# Patient Record
Sex: MALE | Race: WHITE | NOT HISPANIC OR LATINO | Employment: OTHER | URBAN - METROPOLITAN AREA
[De-identification: names, ages, dates, MRNs, and addresses within clinical notes are randomized per-mention and may not be internally consistent; named-entity substitution may affect disease eponyms.]

---

## 2018-09-16 PROBLEM — J39.8 TRACHEAL OBSTRUCTION: Status: ACTIVE | Noted: 2018-09-16

## 2018-09-17 PROBLEM — R63.8 ALTERATION IN NUTRITION: Status: ACTIVE | Noted: 2018-09-17

## 2018-09-17 PROBLEM — J96.02 ACUTE HYPERCAPNIC RESPIRATORY FAILURE: Status: ACTIVE | Noted: 2018-09-17

## 2018-09-17 PROBLEM — Z99.11 ON MECHANICALLY ASSISTED VENTILATION: Status: ACTIVE | Noted: 2018-09-17

## 2018-09-17 PROBLEM — E63.9 INADEQUATE DIETARY ENERGY INTAKE: Status: ACTIVE | Noted: 2018-09-17

## 2018-09-17 PROBLEM — I46.9 CARDIAC ARREST: Status: ACTIVE | Noted: 2018-09-17

## 2018-09-17 PROBLEM — E87.6 HYPOKALEMIA: Status: ACTIVE | Noted: 2018-09-17

## 2018-09-17 PROBLEM — G82.20 PARAPLEGIA: Status: ACTIVE | Noted: 2018-09-17
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
## 2018-09-17 PROBLEM — J69.0 ASPIRATION PNEUMONIA: Status: ACTIVE | Noted: 2018-09-17

## 2018-09-17 PROBLEM — T17.920A: Status: ACTIVE | Noted: 2018-09-17

## 2018-09-18 PROBLEM — Z43.3 COLOSTOMY CARE: Status: ACTIVE | Noted: 2018-09-18

## 2018-09-18 PROBLEM — S31.811A TEAR OF SKIN OF RIGHT BUTTOCK: Status: ACTIVE | Noted: 2018-09-18

## 2018-09-18 PROBLEM — L89.304: Status: ACTIVE | Noted: 2018-09-18

## 2018-09-18 PROBLEM — L89.891: Status: ACTIVE | Noted: 2018-09-18

## 2020-01-01 ENCOUNTER — OFFICE VISIT (OUTPATIENT)
Dept: WOUND CARE | Facility: HOSPITAL | Age: 50
End: 2020-01-01
Attending: SURGERY
Payer: MEDICARE

## 2020-01-01 VITALS
TEMPERATURE: 98 F | RESPIRATION RATE: 16 BRPM | DIASTOLIC BLOOD PRESSURE: 80 MMHG | HEART RATE: 75 BPM | SYSTOLIC BLOOD PRESSURE: 130 MMHG

## 2020-01-01 VITALS
RESPIRATION RATE: 18 BRPM | SYSTOLIC BLOOD PRESSURE: 116 MMHG | HEART RATE: 73 BPM | TEMPERATURE: 98 F | DIASTOLIC BLOOD PRESSURE: 91 MMHG

## 2020-01-01 VITALS
DIASTOLIC BLOOD PRESSURE: 81 MMHG | RESPIRATION RATE: 18 BRPM | SYSTOLIC BLOOD PRESSURE: 115 MMHG | TEMPERATURE: 98 F | HEART RATE: 72 BPM | HEART RATE: 76 BPM | HEART RATE: 75 BPM | SYSTOLIC BLOOD PRESSURE: 130 MMHG | SYSTOLIC BLOOD PRESSURE: 130 MMHG | TEMPERATURE: 99 F | TEMPERATURE: 99 F | DIASTOLIC BLOOD PRESSURE: 87 MMHG | DIASTOLIC BLOOD PRESSURE: 88 MMHG

## 2020-01-01 VITALS
DIASTOLIC BLOOD PRESSURE: 78 MMHG | RESPIRATION RATE: 20 BRPM | SYSTOLIC BLOOD PRESSURE: 114 MMHG | TEMPERATURE: 99 F | HEART RATE: 67 BPM

## 2020-01-01 VITALS
HEART RATE: 82 BPM | SYSTOLIC BLOOD PRESSURE: 132 MMHG | TEMPERATURE: 100 F | RESPIRATION RATE: 20 BRPM | DIASTOLIC BLOOD PRESSURE: 90 MMHG

## 2020-01-01 VITALS — HEART RATE: 67 BPM | DIASTOLIC BLOOD PRESSURE: 78 MMHG | SYSTOLIC BLOOD PRESSURE: 105 MMHG

## 2020-01-01 DIAGNOSIS — L97.522 NEUROPATHIC ULCER OF FOOT WITH FAT LAYER EXPOSED, LEFT: Primary | ICD-10-CM

## 2020-01-01 DIAGNOSIS — L97.522 NEUROPATHIC ULCER OF FOOT WITH FAT LAYER EXPOSED, LEFT: ICD-10-CM

## 2020-01-01 PROCEDURE — 99499 UNLISTED E&M SERVICE: CPT | Mod: ,,, | Performed by: SURGERY

## 2020-01-01 PROCEDURE — 11042 DBRDMT SUBQ TIS 1ST 20SQCM/<: CPT

## 2020-01-01 PROCEDURE — 27201912 HC WOUND CARE DEBRIDEMENT SUPPLIES

## 2020-01-01 PROCEDURE — 99212 OFFICE O/P EST SF 10 MIN: CPT

## 2020-01-01 PROCEDURE — 99499 NO LOS: ICD-10-PCS | Mod: ,,, | Performed by: SURGERY

## 2020-01-01 PROCEDURE — 99203 OFFICE O/P NEW LOW 30 MIN: CPT | Mod: 25

## 2020-05-13 PROBLEM — R33.9 INCOMPLETE BLADDER EMPTYING: Status: ACTIVE | Noted: 2020-01-01

## 2020-05-13 PROBLEM — N31.9 NEUROGENIC BLADDER: Status: ACTIVE | Noted: 2020-01-01

## 2020-06-11 PROBLEM — L97.522: Status: ACTIVE | Noted: 2020-01-01

## 2020-06-11 NOTE — ASSESSMENT & PLAN NOTE
Fourth met head and 5th met head plantar ulcers debrided today.  As a large amount of callus removed.  The 4th is into the fatty tissue layer but not any deeper.  The 5th metatarsal head is deeper and goes to the bone but bone is not exposed at this time. debridement was performed and silver product will be applied along with offloading.

## 2020-06-11 NOTE — PROGRESS NOTES
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    Timin    Severity:  Fat layer exposed bone palpable at 5th metatarsal head    Quality:  Fat layer exposed    Modifying Factors: quadriplegia           Current Assessment & Plan     Fourth met head and 5th met head plantar ulcers debrided today.  As a large amount of callus removed.  The 4th is into the fatty tissue layer but not any deeper.  The 5th metatarsal head is deeper and goes to the bone but bone is not exposed at this time. debridement was performed and silver product will be applied along with offloading.               See wound doc progress notes. Documents will be scanned.        Wally Hernández  [facility]

## 2020-06-18 NOTE — PROGRESS NOTES
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  No palpable bone today.  There is some granulation tissue.  Silver alginate has been used in the past.  I would like to switched to Mepilex Ag foam.  Sharp debridement performed today.    Location:  Left foot plantar aspect metatarsal heads of the 4th and 5th digits    Duration:  3 weeks    Context:  Patient is neuropathic is quadriplegic    Associated Signs & Symptoms:  No pain or sensation.    Timin    Severity:  Fat layer exposed bone palpable at 5th metatarsal head    Quality:  Fat layer exposed    Modifying Factors: quadriplegia                 See wound doc progress notes. Documents will be scanned.        Osvaldo Gonzalez Jr  [facility]

## 2020-06-25 NOTE — PROGRESS NOTES
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  No palpable bone today.  There is some granulation tissue.  Silver alginate has been used in the past.  I would like to switched to Mepilex Ag foam.  Sharp debridement performed today.    Location:  Left foot plantar aspect metatarsal heads of the 4th and 5th digits    Duration:  3 weeks    Context:  Patient is neuropathic is quadriplegic    Associated Signs & Symptoms:  No pain or sensation.    Timin    Severity:  Fat layer exposed bone palpable at 5th metatarsal head    Quality:  Fat layer exposed    Modifying Factors: quadriplegia           Current Assessment & Plan     The wounds are decreasing in size.  Continue same plan               See wound doc progress notes. Documents will be scanned.        Wally Hernández  [facility]

## 2020-06-29 PROBLEM — N39.0 UTI (URINARY TRACT INFECTION): Status: ACTIVE | Noted: 2020-01-01

## 2020-07-09 NOTE — PROGRESS NOTES
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  No palpable bone today.  There is some granulation tissue.  Silver alginate has been used in the past.  I would like to switched to Mepilex Ag foam.  Sharp debridement performed today.    Location:  Left foot plantar aspect metatarsal heads of the 4th and 5th digits    Duration:  3 weeks    Context:  Patient is neuropathic is quadriplegic    Associated Signs & Symptoms:  No pain or sensation.    Timin    Severity:  Fat layer exposed bone palpable at 5th metatarsal head    Quality:  Fat layer exposed    Modifying Factors: quadriplegia           Current Assessment & Plan     The 2 areas on the plantar aspect of the foot or debrided with a scalpel removing the callus which had old blood present.  Underneath these are 2 small ulcers.  Debridement is performed the subcutaneous tissue layers pressure applied.               See wound doc progress notes. Documents will be scanned.        Wally Hernández  [facility]

## 2020-07-09 NOTE — ASSESSMENT & PLAN NOTE
The 2 areas on the plantar aspect of the foot or debrided with a scalpel removing the callus which had old blood present.  Underneath these are 2 small ulcers.  Debridement is performed the subcutaneous tissue layers pressure applied.

## 2020-07-16 NOTE — PROGRESS NOTES
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  No palpable bone today.  There is some granulation tissue.  Silver alginate has been used in the past.  I would like to switched to Mepilex Ag foam.  Sharp debridement performed today.    Location:  Left foot plantar aspect metatarsal heads of the 4th and 5th digits    Duration:  3 weeks    Context:  Patient is neuropathic is quadriplegic    Associated Signs & Symptoms:  No pain or sensation.    Timin    Severity:  Fat layer exposed bone palpable at 5th metatarsal head    Quality:  Fat layer exposed    Modifying Factors: quadriplegia           Current Assessment & Plan     The wounds are getting smaller.  Continue same wound plan.               See wound doc progress notes. Documents will be scanned.        Wally Hernández  [facility]

## 2020-07-23 NOTE — PROGRESS NOTES
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  No palpable bone today.  There is some granulation tissue.  Silver alginate has been used in the past.  I would like to switched to Mepilex Ag foam.  Sharp debridement performed today.  Continue Mepilex Ag.    Location:  Left foot plantar aspect metatarsal heads of the 4th and 5th digits    Duration:  3 weeks    Context:  Patient is neuropathic is quadriplegic    Associated Signs & Symptoms:  No pain or sensation.    Timin    Severity:  Fat layer exposed bone palpable at 5th metatarsal head    Quality:  Fat layer exposed    Modifying Factors: quadriplegia                 See wound doc progress notes. Documents will be scanned.        Osvaldo Gonzalez Jr  [facility]

## 2020-07-30 NOTE — PROGRESS NOTES
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  No palpable bone today.  Wound appears healed today.  No debridement performed.  Patient still has Mepilex Ag at home.  I told him to continue this for protection and follow up with us in 2 weeks for surveillance.    Location:  Left foot plantar aspect metatarsal heads of the 4th and 5th digits    Duration:  3 weeks    Context:  Patient is neuropathic is quadriplegic    Associated Signs & Symptoms:  No pain or sensation.    Timin    Severity:  Fat layer exposed bone palpable at 5th metatarsal head    Quality:  Fat layer exposed    Modifying Factors: quadriplegia                 See wound doc progress notes. Documents will be scanned.        Osvaldo Gonzalez Jr  [facility]

## 2020-08-13 NOTE — PROGRESS NOTES
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  No palpable bone today.  Wound appears healed today.  No debridement performed.  Patient still has Mepilex Ag at home.  I told him to continue this for protection and follow up with us in 2 weeks for surveillance.    Location:  Left foot plantar aspect metatarsal heads of the 4th and 5th digits    Duration:  3 weeks    Context:  Patient is neuropathic is quadriplegic    Associated Signs & Symptoms:  No pain or sensation.    Timin    Severity:  Fat layer exposed bone palpable at 5th metatarsal head    Quality:  Fat layer exposed    Modifying Factors: quadriplegia           Current Assessment & Plan     Wounds now resolved.  Discharge               See wound doc progress notes. Documents will be scanned.        Wally Hernández  [facility]

## 2020-11-25 PROBLEM — R07.9 CHEST PAIN: Status: ACTIVE | Noted: 2020-01-01

## 2020-11-26 PROBLEM — J96.01 ACUTE RESPIRATORY FAILURE WITH HYPOXIA AND HYPERCARBIA: Status: ACTIVE | Noted: 2020-01-01

## 2020-11-26 PROBLEM — J96.02 ACUTE RESPIRATORY FAILURE WITH HYPOXIA AND HYPERCARBIA: Status: ACTIVE | Noted: 2020-01-01

## 2020-11-26 PROBLEM — U07.1 COVID-19: Status: ACTIVE | Noted: 2020-01-01

## 2020-11-30 PROBLEM — A49.8 PSEUDOMONAS INFECTION: Status: ACTIVE | Noted: 2020-01-01

## 2020-12-01 PROBLEM — R63.8 INCREASED NUTRITIONAL NEEDS: Status: ACTIVE | Noted: 2020-01-01

## 2020-12-01 PROBLEM — U07.1 PNEUMONIA DUE TO COVID-19 VIRUS: Status: ACTIVE | Noted: 2020-01-01

## 2020-12-01 PROBLEM — J12.82 PNEUMONIA DUE TO COVID-19 VIRUS: Status: ACTIVE | Noted: 2020-01-01

## 2020-12-04 PROBLEM — R06.02 SOB (SHORTNESS OF BREATH): Status: ACTIVE | Noted: 2020-01-01

## 2021-10-13 NOTE — PROGRESS NOTES
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    Timin    Severity:  Fat layer exposed bone palpable at 5th metatarsal head    Quality:  Fat layer exposed    Modifying Factors: quadriplegia           Current Assessment & Plan     Fourth met head and 5th met head plantar ulcers debrided today.  As a large amount of callus removed.  The 4th is into the fatty tissue layer but not any deeper.  The 5th metatarsal head is deeper and goes to the bone but bone is not exposed at this time. debridement was performed and silver product will be applied along with offloading.                 History:    Past Medical History:   Diagnosis Date    C4 cervical fracture     c5 c6      C6 cervical fracture     Deviated septum     Encounter for blood transfusion     HTN (hypertension)     Paraplegia     Pneumonia     1/1/2 yrs ago    Thyroid disease        Past Surgical History:   Procedure Laterality Date    CARDIAC SURGERY      age 4    CLOSURE OF WOUND      [place]    COLONOSCOPY      COLOSTOMY      CYSTOSCOPY      DILATION OF URETHRA N/A 2020    Procedure: DILATION, URETHRAL STRICTURE;  Surgeon: Chuck Garcia MD;  Location: [facility] OR;  Service: Urology;  Laterality: N/A;    NECK SURGERY      supra pubic cath      SUPRAPUBIC TUBE PLACEMENT         Family History   Problem Relation Age of Onset    Cancer Mother     Diabetes Father     Heart disease Father     Hypertension Father         reports that he has never smoked. He has quit using smokeless tobacco. He reports that he drinks alcohol. He reports that he does not use drugs.    has a  current medication list which includes the following prescription(s): albuterol, diazepam, levothyroxine, lisinopril, methocarbamol, oxybutynin, oxybutynin, and sertraline.    Allergies:  Bactrim [sulfamethoxazole-trimethoprim] and Pcn [penicillins]    Review of Systems:  Review of Systems   Constitutional: Negative.    HENT: Negative.    Eyes: Negative.    Respiratory: Negative.    Gastrointestinal: Negative.    Psychiatric/Behavioral: Negative.          There were no vitals filed for this visit.      BMI:  There is no height or weight on file to calculate BMI.    Physical Exam:  Physical Exam   Constitutional: He appears well-developed and well-nourished.   Eyes: Pupils are equal, round, and reactive to light. Conjunctivae and EOM are normal.   Neck: Normal range of motion. Neck supple.   Cardiovascular: Normal rate, regular rhythm and normal heart sounds.   Pulmonary/Chest: Effort normal and breath sounds normal.   Neurological:   Patient is quadriplegic   Skin:   See wound sheet for documentation of ulcers on the plantar aspect of the left foot       A1C:  No results for input(s): HGBA1C in the last 2160 hours.  BMP:  Recent Labs   Lab Result Units 05/27/20  1334   Glucose mg/dL 146*   Sodium mmol/L 138   Potassium mmol/L 3.7   Chloride mmol/L 97   CO2 mmol/L 30*   BUN, Bld mg/dL 16   Creatinine mg/dL 0.60*   Calcium mg/dL 9.3      CBC:  Recent Labs   Lab Result Units 05/27/20  1334   WBC K/uL 7.90   RBC M/uL 5.02   Hemoglobin g/dL 14.7   Hematocrit % 45.6   Platelets K/uL 186   Mean Corpuscular Volume fL 91   Mean Corpuscular Hemoglobin pg 29.4   Mean Corpuscular Hemoglobin Conc g/dL 32.3     CMP:  Recent Labs   Lab Result Units 05/27/20  1334   Glucose mg/dL 146*   Calcium mg/dL 9.3   Sodium mmol/L 138   Potassium mmol/L 3.7   CO2 mmol/L 30*   Chloride mmol/L 97   BUN, Bld mg/dL 16     PREALBUMIN:  No results for input(s): PREALBUMIN in the last 2160 hours.  WOUND CULTURES:  No results for input(s): LABAERO in  the last 2160 hours.        Plan:  See Wound Docs note for plan and follow up.        Wally WILHELM Landry [facility]     Adequate: hears normal conversation without difficulty